# Patient Record
Sex: FEMALE | Race: AMERICAN INDIAN OR ALASKA NATIVE
[De-identification: names, ages, dates, MRNs, and addresses within clinical notes are randomized per-mention and may not be internally consistent; named-entity substitution may affect disease eponyms.]

---

## 2017-05-08 ENCOUNTER — HOSPITAL ENCOUNTER (EMERGENCY)
Dept: HOSPITAL 5 - ED | Age: 57
Discharge: LEFT BEFORE DISCHARGE | End: 2017-05-08
Payer: SELF-PAY

## 2017-05-08 VITALS — DIASTOLIC BLOOD PRESSURE: 88 MMHG | SYSTOLIC BLOOD PRESSURE: 144 MMHG

## 2017-05-08 DIAGNOSIS — M25.511: ICD-10-CM

## 2017-05-08 DIAGNOSIS — Z53.21: ICD-10-CM

## 2017-05-08 DIAGNOSIS — R07.9: Primary | ICD-10-CM

## 2017-05-08 DIAGNOSIS — M25.512: ICD-10-CM

## 2017-05-08 LAB
ALBUMIN SERPL-MCNC: 4.2 G/DL (ref 3.9–5)
ALBUMIN/GLOB SERPL: 1.4 %
ALP SERPL-CCNC: 59 UNITS/L (ref 35–129)
ALT SERPL-CCNC: 24 UNITS/L (ref 7–56)
ANION GAP SERPL CALC-SCNC: 18 MMOL/L
BACTERIA #/AREA URNS HPF: (no result) /HPF
BASOPHILS NFR BLD AUTO: 0.4 % (ref 0–1.8)
BILIRUB SERPL-MCNC: 0.6 MG/DL (ref 0.1–1.2)
BILIRUB UR QL STRIP: (no result)
BLOOD UR QL VISUAL: (no result)
BUN SERPL-MCNC: 15 MG/DL (ref 7–17)
BUN/CREAT SERPL: 18.75 %
CALCIUM SERPL-MCNC: 9.3 MG/DL (ref 8.4–10.2)
CHLORIDE SERPL-SCNC: 100 MMOL/L (ref 98–107)
CK SERPL-CCNC: 137 UNITS/L (ref 30–135)
CO2 SERPL-SCNC: 25 MMOL/L (ref 22–30)
EOSINOPHIL NFR BLD AUTO: 1.6 % (ref 0–4.3)
GLUCOSE SERPL-MCNC: 104 MG/DL (ref 65–100)
HCT VFR BLD CALC: 37.4 % (ref 30.3–42.9)
HGB BLD-MCNC: 12.1 GM/DL (ref 10.1–14.3)
KETONES UR STRIP-MCNC: (no result) MG/DL
LEUKOCYTE ESTERASE UR QL STRIP: (no result)
MCH RBC QN AUTO: 30 PG (ref 28–32)
MCHC RBC AUTO-ENTMCNC: 33 % (ref 30–34)
MCV RBC AUTO: 91 FL (ref 79–97)
MUCOUS THREADS #/AREA URNS HPF: (no result) /HPF
NITRITE UR QL STRIP: (no result)
PH UR STRIP: 5 [PH] (ref 5–7)
PLATELET # BLD: 195 K/MM3 (ref 140–440)
POTASSIUM SERPL-SCNC: 4 MMOL/L (ref 3.6–5)
PROT SERPL-MCNC: 7.1 G/DL (ref 6.3–8.2)
PROT UR STRIP-MCNC: (no result) MG/DL
RBC # BLD AUTO: 4.12 M/MM3 (ref 3.65–5.03)
RBC #/AREA URNS HPF: 9 /HPF (ref 0–6)
SODIUM SERPL-SCNC: 139 MMOL/L (ref 137–145)
UROBILINOGEN UR-MCNC: < 2 MG/DL (ref ?–2)
WBC # BLD AUTO: 7 K/MM3 (ref 4.5–11)
WBC #/AREA URNS HPF: 2 /HPF (ref 0–6)

## 2017-05-08 PROCEDURE — 82553 CREATINE MB FRACTION: CPT

## 2017-05-08 PROCEDURE — 84484 ASSAY OF TROPONIN QUANT: CPT

## 2017-05-08 PROCEDURE — 85025 COMPLETE CBC W/AUTO DIFF WBC: CPT

## 2017-05-08 PROCEDURE — 93010 ELECTROCARDIOGRAM REPORT: CPT

## 2017-05-08 PROCEDURE — 71020: CPT

## 2017-05-08 PROCEDURE — 80053 COMPREHEN METABOLIC PANEL: CPT

## 2017-05-08 PROCEDURE — 83880 ASSAY OF NATRIURETIC PEPTIDE: CPT

## 2017-05-08 PROCEDURE — 82550 ASSAY OF CK (CPK): CPT

## 2017-05-08 PROCEDURE — 93005 ELECTROCARDIOGRAM TRACING: CPT

## 2017-05-08 PROCEDURE — 36415 COLL VENOUS BLD VENIPUNCTURE: CPT

## 2017-05-08 PROCEDURE — 81001 URINALYSIS AUTO W/SCOPE: CPT

## 2017-05-08 NOTE — EMERGENCY DEPARTMENT REPORT
Entered by BETTIE JEAN BAPTISTE, acting as scribe for CARLOS MCCLOUD NP.


Chief Complaint: Chest Pain


Stated Complaint: CHEST PAIN, HEART DX


Time Seen by Provider: 05/08/17 14:34





- HPI


History of Present Illness: 


57 y/o non-toxic, non ill-appearing female in no acute distress presents to ED c

/o chest pain beginning 2 days ago, described as a heavy and aching with 

persistent soreness, radiating to shoulders bilaterally and noting her pain has 

moved from left chest to right chest. Exacerbated by deep breaths. Denies 

radiation to jaw or extremity swelling. Reports Hx cardiac stents, heart 

disease. Endorses mild SOB.





- ROS


Review of Systems: 


+ chest pain, aching, radiating 


+ sob


- abdominal pain, nausea, vomiting, extremity swelling





- Exam


Vital Signs: 


 Vital Signs











  05/08/17





  14:29


 


Temperature 98.0 F


 


Pulse Rate 74


 


Respiratory 18





Rate 


 


Blood Pressure 144/88


 


O2 Sat by Pulse 99





Oximetry 











Physical Exam: 


Cardio: Normal S1 S2 heart sounds, no murmurs


Resp: Clear to auscultation bilaterally


Pt is non-toxic, non ill appearing, no acute distress


Nonreproducible Chest pain


MSE screening note: 


Focused history and physical exam performed.


Due to findings the following was ordered:





CBC, troponin, cardiac CK, CMP, BNP, UA, CXR





ED Disposition for MSE


Condition: Stable





This documentation as recorded by the scribe,BETTIE JEAN BAPTISTE,accurately reflects the 

service I personally performed and the decisions made by ROGERS santiago MARTIN, AC.

## 2017-05-11 NOTE — ED ELOPEMENT REVIEW
ED Pt Elopement review





- Results review


Lab results: 





 Laboratory Tests











  05/08/17 05/08/17 05/08/17





  14:42 14:42 19:01


 


WBC  7.0  


 


RBC  4.12  


 


Hgb  12.1  


 


Hct  37.4  


 


MCV  91  


 


MCH  30  


 


MCHC  33  


 


RDW  12.9 L  


 


Plt Count  195  


 


Lymph % (Auto)  32.0  


 


Mono % (Auto)  7.5 H  


 


Eos % (Auto)  1.6  


 


Baso % (Auto)  0.4  


 


Lymph #  2.2  


 


Mono #  0.5  


 


Eos #  0.1  


 


Baso #  0.0  


 


Seg Neutrophils %  58.5  


 


Seg Neutrophils #  4.1  


 


Sodium   139 


 


Potassium   4.0 


 


Chloride   100.0 


 


Carbon Dioxide   25 


 


Anion Gap   18 


 


BUN   15 


 


Creatinine   0.8 


 


Estimated GFR   > 60 


 


BUN/Creatinine Ratio   18.75 


 


Glucose   104 H 


 


Calcium   9.3 


 


Total Bilirubin   0.60 


 


AST   22 


 


ALT   24 


 


Alkaline Phosphatase   59 


 


Total Creatine Kinase   137 H 


 


CK-MB (CK-2)   1.6 


 


CK-MB (CK-2) Rel Index   1.1 


 


Troponin T   < 0.010 


 


NT-Pro-B Natriuret Pep   76.06 


 


Total Protein   7.1 


 


Albumin   4.2 


 


Albumin/Globulin Ratio   1.4 


 


Urine Color    Yellow


 


Urine Turbidity    Slightly-cloudy


 


Urine pH    5.0


 


Ur Specific Gravity    1.018


 


Urine Protein    <15 mg/dl


 


Urine Glucose (UA)    Neg


 


Urine Ketones    Neg


 


Urine Blood    Mod


 


Urine Nitrite    Neg


 


Urine Bilirubin    Neg


 


Urine Urobilinogen    < 2.0


 


Ur Leukocyte Esterase    Lg


 


Urine WBC (Auto)    2.0


 


Urine RBC (Auto)    9.0


 


U Epithel Cells (Auto)    11.0


 


Urine Bacteria (Auto)    1+


 


Urine Mucus    Few














- Call Back decision


Pt Call Back Decision: No action required

## 2017-09-19 ENCOUNTER — HOSPITAL ENCOUNTER (INPATIENT)
Dept: HOSPITAL 5 - ED | Age: 57
LOS: 2 days | Discharge: HOME | DRG: 313 | End: 2017-09-21
Attending: INTERNAL MEDICINE | Admitting: INTERNAL MEDICINE
Payer: COMMERCIAL

## 2017-09-19 DIAGNOSIS — I25.10: ICD-10-CM

## 2017-09-19 DIAGNOSIS — Z90.710: ICD-10-CM

## 2017-09-19 DIAGNOSIS — I10: ICD-10-CM

## 2017-09-19 DIAGNOSIS — R07.89: Primary | ICD-10-CM

## 2017-09-19 DIAGNOSIS — F17.210: ICD-10-CM

## 2017-09-19 DIAGNOSIS — D64.9: ICD-10-CM

## 2017-09-19 DIAGNOSIS — Z83.3: ICD-10-CM

## 2017-09-19 DIAGNOSIS — I25.2: ICD-10-CM

## 2017-09-19 DIAGNOSIS — Z79.899: ICD-10-CM

## 2017-09-19 DIAGNOSIS — K21.9: ICD-10-CM

## 2017-09-19 DIAGNOSIS — E78.00: ICD-10-CM

## 2017-09-19 DIAGNOSIS — Z95.5: ICD-10-CM

## 2017-09-19 DIAGNOSIS — Z71.6: ICD-10-CM

## 2017-09-19 DIAGNOSIS — E87.6: ICD-10-CM

## 2017-09-19 DIAGNOSIS — Z72.89: ICD-10-CM

## 2017-09-19 DIAGNOSIS — F41.9: ICD-10-CM

## 2017-09-19 DIAGNOSIS — E78.5: ICD-10-CM

## 2017-09-19 LAB
ANION GAP SERPL CALC-SCNC: 19 MMOL/L
BASOPHILS NFR BLD AUTO: 0.5 % (ref 0–1.8)
BUN SERPL-MCNC: 21 MG/DL (ref 7–17)
BUN/CREAT SERPL: 35 %
CALCIUM SERPL-MCNC: 9.6 MG/DL (ref 8.4–10.2)
CHLORIDE SERPL-SCNC: 98.7 MMOL/L (ref 98–107)
CO2 SERPL-SCNC: 24 MMOL/L (ref 22–30)
EOSINOPHIL NFR BLD AUTO: 3.1 % (ref 0–4.3)
GLUCOSE SERPL-MCNC: 148 MG/DL (ref 65–100)
HCT VFR BLD CALC: 39.3 % (ref 30.3–42.9)
HGB BLD-MCNC: 13.2 GM/DL (ref 10.1–14.3)
MCH RBC QN AUTO: 29 PG (ref 28–32)
MCHC RBC AUTO-ENTMCNC: 34 % (ref 30–34)
MCV RBC AUTO: 87 FL (ref 79–97)
PLATELET # BLD: 186 K/MM3 (ref 140–440)
POTASSIUM SERPL-SCNC: 3.2 MMOL/L (ref 3.6–5)
RBC # BLD AUTO: 4.51 M/MM3 (ref 3.65–5.03)
SODIUM SERPL-SCNC: 138 MMOL/L (ref 137–145)
WBC # BLD AUTO: 4.8 K/MM3 (ref 4.5–11)

## 2017-09-19 PROCEDURE — 85025 COMPLETE CBC W/AUTO DIFF WBC: CPT

## 2017-09-19 PROCEDURE — 80061 LIPID PANEL: CPT

## 2017-09-19 PROCEDURE — 93017 CV STRESS TEST TRACING ONLY: CPT

## 2017-09-19 PROCEDURE — 36415 COLL VENOUS BLD VENIPUNCTURE: CPT

## 2017-09-19 PROCEDURE — 99406 BEHAV CHNG SMOKING 3-10 MIN: CPT

## 2017-09-19 PROCEDURE — 99285 EMERGENCY DEPT VISIT HI MDM: CPT

## 2017-09-19 PROCEDURE — 94760 N-INVAS EAR/PLS OXIMETRY 1: CPT

## 2017-09-19 PROCEDURE — 71020: CPT

## 2017-09-19 PROCEDURE — 93010 ELECTROCARDIOGRAM REPORT: CPT

## 2017-09-19 PROCEDURE — 84484 ASSAY OF TROPONIN QUANT: CPT

## 2017-09-19 PROCEDURE — A9502 TC99M TETROFOSMIN: HCPCS

## 2017-09-19 PROCEDURE — 78452 HT MUSCLE IMAGE SPECT MULT: CPT

## 2017-09-19 PROCEDURE — 93005 ELECTROCARDIOGRAM TRACING: CPT

## 2017-09-19 PROCEDURE — 85379 FIBRIN DEGRADATION QUANT: CPT

## 2017-09-19 PROCEDURE — 93306 TTE W/DOPPLER COMPLETE: CPT

## 2017-09-19 PROCEDURE — 80048 BASIC METABOLIC PNL TOTAL CA: CPT

## 2017-09-19 PROCEDURE — 83735 ASSAY OF MAGNESIUM: CPT

## 2017-09-20 LAB
ANION GAP SERPL CALC-SCNC: 18 MMOL/L
BUN SERPL-MCNC: 20 MG/DL (ref 7–17)
BUN/CREAT SERPL: 33.33 %
CALCIUM SERPL-MCNC: 9 MG/DL (ref 8.4–10.2)
CHLORIDE SERPL-SCNC: 103.2 MMOL/L (ref 98–107)
CO2 SERPL-SCNC: 24 MMOL/L (ref 22–30)
GLUCOSE SERPL-MCNC: 105 MG/DL (ref 65–100)
POTASSIUM SERPL-SCNC: 4 MMOL/L (ref 3.6–5)
SODIUM SERPL-SCNC: 141 MMOL/L (ref 137–145)

## 2017-09-20 RX ADMIN — ASPIRIN SCH MG: 325 TABLET ORAL at 11:38

## 2017-09-20 RX ADMIN — METOPROLOL TARTRATE SCH MG: 50 TABLET, FILM COATED ORAL at 21:30

## 2017-09-20 RX ADMIN — NITROGLYCERIN SCH: 20 OINTMENT TOPICAL at 17:55

## 2017-09-20 RX ADMIN — HEPARIN SODIUM SCH UNIT: 5000 INJECTION, SOLUTION INTRAVENOUS; SUBCUTANEOUS at 21:29

## 2017-09-20 RX ADMIN — NITROGLYCERIN SCH: 20 OINTMENT TOPICAL at 15:02

## 2017-09-20 RX ADMIN — NITROGLYCERIN SCH INCH: 20 OINTMENT TOPICAL at 11:42

## 2017-09-20 RX ADMIN — HEPARIN SODIUM SCH UNIT: 5000 INJECTION, SOLUTION INTRAVENOUS; SUBCUTANEOUS at 11:43

## 2017-09-20 RX ADMIN — HYDROCHLOROTHIAZIDE SCH MG: 12.5 CAPSULE ORAL at 11:38

## 2017-09-20 RX ADMIN — NITROGLYCERIN SCH INCH: 20 OINTMENT TOPICAL at 04:22

## 2017-09-20 RX ADMIN — LISINOPRIL SCH MG: 20 TABLET ORAL at 11:40

## 2017-09-20 RX ADMIN — METOPROLOL TARTRATE SCH MG: 50 TABLET, FILM COATED ORAL at 11:42

## 2017-09-20 NOTE — CONSULTATION
History of Present Illness


Consult date: 09/20/17


Requesting physician: DUNIA WARD


Consult reason: chest pain


History of present illness: 


The pt is a 55 YO female with a past medical history significant for HTN, HLP, 

CAD s/p MI with PCI of proximal left circ in 2006 at [place], anemia requiring 

multiple blood transfusions, anxiety, tobacco use (smokes 3-4 cigarettes daily) 

and ETOH use (drinks 3 glasses of wine daily). She is previously unknown to our 

practice. She states the last cardiologist she saw was at [facility] in January, 2017. She presented with c/o chest pain for the past 3-4 days prior to arrival. 

She describes the pain as a nonexertional, nonradiating, intermittent left 

sided aching pain which was associated with SOB, diaphoresis, palpitations and 

lightheadedness. The pain is aggravated by lying flat and alleviated by sitting 

upright. She also reports some right sided chest soreness for 1-2 days PTA. She 

denies any recent fever, chills, cough, n/v or syncope. On evaluation, she 

states that the chest pain has resolved since nitro patch was placed. The pain 

is not reproducible with palpation. She underwent stress test this AM and 

results are pending. 








Past History


Past Medical History: CAD, hypertension, hyperlipidemia, other (PCI in 2006)


Past Surgical History: hysterectomy


Social history: smoking, alcohol abuse.  denies: prescription drug abuse


Family history: diabetes





Medications and Allergies


 Allergies











Allergy/AdvReac Type Severity Reaction Status Date / Time


 


No Known Allergies Allergy   Unverified 02/23/14 12:06











 Home Medications











 Medication  Instructions  Recorded  Confirmed  Last Taken  Type


 


Ibuprofen [Motrin] 800 mg PO TID PRN #20 tablet 02/23/14 09/20/17 07/22/17 Rx


 


Lisinopril/Hydrochlorothiazide 1 tab PO QDAY 02/23/14 09/20/17 08/21/17 History





[Zestoretic 20-12.5 mg]     


 


Metoprolol [Lopressor TAB] 50 mg PO BID 02/23/14 09/20/17 09/20/17 History











Active Meds: 


Active Medications





Aspirin (Aspirin)  325 mg PO DAILY [facility]


Heparin Sodium (Porcine) (Heparin)  5,000 unit SUB-Q Q12HR [facility]


Hydrochlorothiazide (Hctz)  12.5 mg PO QDAY [facility]


Lisinopril (Zestril)  20 mg PO QDAY [facility]


Metoprolol Tartrate (Lopressor)  50 mg PO BID [facility]


Morphine Sulfate (Morphine)  2 mg IV Q2H PRN


   PRN Reason: Pain, Moderate (4-6)


Nitroglycerin (Nitrostat)  0.4 mg SL Q5M PRN


   PRN Reason: Chest Pain


Nitroglycerin (Nitro-Bid 2%)  1 inch TP QIDNTG [facility]


   PRN Reason: Protocol


   Last Admin: 09/20/17 04:22 Dose:  1 inch


Ondansetron HCl (Zofran)  4 mg IV Q6H PRN


   PRN Reason: Nausea And Vomiting











Review of Systems


Constitutional: sweats, no weight loss, no weight gain, no fever, no chills


Ears, nose, mouth and throat: no ear pain, no nose pain, no sinus pressure, no 

sinus pain


Cardiovascular: chest pain, palpitations, lightheadedness, shortness of breath, 

high blood pressure, no orthopnea, no rapid/irregular heart beat, no edema, no 

syncope, no dyspnea on exertion, no paroxysmal nocturnal dyspnea, no leg edema, 

no decreased exercise tolerance


Respiratory: shortness of breath, no cough, no dyspnea on exertion, no 

congestion, no wheezing, no pain on inspiration


Gastrointestinal: no abdominal pain, no nausea, no vomiting, no diarrhea, no 

constipation, no change in bowel habits


Genitourinary Female: no pelvic pain, no flank pain, no dysuria, no urinary 

frequency, no urgency


Musculoskeletal: no neck stiffness, no neck pain, no shooting arm pain, no arm 

numbness/tingling, no low back pain, no shooting leg pain, no leg numbness/

tingling, no redness of joints


Integumentary: no rash, no pruritis, no redness, no sores, no wounds


Neurological: no head injury, no paralysis, no weakness, no parathesias, no 

numbness, no tingling, no seizures


Psychiatric: anxiety


Endocrine: no cold intolerance, no heat intolerance


Hematologic/Lymphatic: no easy bruising, no easy bleeding, no lymphadenopathy


Allergic/Immunologic: no urticaria, no wheezing, no persistent infections





Physical Examination


 Vital Signs











Temp Pulse Resp BP Pulse Ox


 


 98.6 F   90   16   163/93   97 


 


 09/19/17 18:24  09/19/17 18:24  09/19/17 18:24  09/19/17 18:24  09/19/17 18:24











General appearance: no acute distress


HEENT: Positive: PERRL, Normocephaly, Mucus Membranes Moist


Neck: Positive: neck supple, trachea midline


Cardiac: Positive: Reg Rate and Rhythm, S1/S2, Systolic Murmur


Lungs: Positive: clear to auscultation


Neuro: Positive: Grossly Intact, Cranial Nerve 2-12 Intact


Abdomen: Positive: Unremarkable, Soft, Active Bowel Sounds.  Negative: Tender


Skin: Positive: Clear.  Negative: Rash


Musculoskeletal: No Fluid Collection, No Pain, Normal Range of Motion


Extremities: Absent: edema





Results





 09/19/17 18:38





 09/20/17 06:40


 Comprehensive Metabolic Panel











  09/20/17 Range/Units





  06:40 


 


Sodium  141  (137-145)  mmol/L


 


Potassium  4.0  D  (3.6-5.0)  mmol/L


 


Chloride  103.2  ()  mmol/L


 


Carbon Dioxide  24  (22-30)  mmol/L


 


BUN  20 H  (7-17)  mg/dL


 


Creatinine  0.6 L  (0.7-1.2)  mg/dL


 


Glucose  105 H  ()  mg/dL


 


Calcium  9.0  (8.4-10.2)  mg/dL














- Imaging and Cardiology


Echo: pending


EKG: image reviewed





EKG interpretations





- Telemetry


EKG Rhythm: Sinus Rhythm





- EKG


Sinus rhythms and dysrhythmias: sinus rhythm


Repolarization changes or abnormalities: nonspecific abnormality, ST segment, 

and/or T wave





Assessment and Plan


Assessment:


Chest pain, atypical - ECG with NAF; troponin negative for AMI x 3 sets


CAD s/p MI with PCI of proximal left circ in 2006 at [place]


HTN


HLP


Hypokalemia - repleted


H/o anemia requiring multiple blood transfusions - H/H currently WNL


Tobacco use / ETOH use - cessation encouraged





Plan:


Lexiscan MPI stress test this AM was negative for ischemia, EF 63%. 


Obtain echo. 


Cont , lopressor and lisinopril/HCTZ. Titrate as necessary for BP 

optimization. 


Obtain lipid panel. Plan to initiate lovastatin as OP d/t pt's current lack of 

insurance and financial difficulties. 


Assessment and plan reviewed with pt at bedside. 





The patient has been seen in conjunction with Dr. Bojorquez who agrees with the 

assessment and plan of care.

## 2017-09-20 NOTE — EMERGENCY DEPARTMENT REPORT
ED General Adult HPI





- General


Chief complaint: Chest Pain


Stated complaint: CHEST PAIN


Time Seen by Provider: 09/20/17 02:07


Source: patient, RN notes reviewed


Mode of arrival: Wheelchair


Limitations: No Limitations





- History of Present Illness


Initial comments: 





This is a 56-year-old female, the patient is previously unknown to me.





Secondary to insurance issues, she does not have a local primary care doctor 

cardiologist, she reports past medical history of heart disease with stent, 

hypertension and anemia, high cholesterol





Patient presents to the ER with central chest pressure which radiates to the 

right upper extremity.  She endorses diaphoresis, shortness of breath, 

palpitations and malaise.  The symptoms are intermittent, and do not have 

exacerbating or relieving factors.  There is no leg pain, there is no leg 

swelling, no recent trips greater than 4 hours, no recent hospital admissions, 

patient endorses no recent cardiac risk stratification or stress test.


-: Gradual


Location: chest


Severity scale (0 -10): 5


Quality: aching


Consistency: intermittent


Improves with: none


Worsens with: none


Associated Symptoms: chest pain





- Related Data


 Home Medications











 Medication  Instructions  Recorded  Confirmed  Last Taken


 


Lisinopril/Hydrochlorothiazide 1 tab PO QDAY 02/23/14 09/20/17 08/21/17





[Zestoretic 20-12.5 mg]    


 


Metoprolol [Lopressor TAB] 50 mg PO BID 02/23/14 09/20/17 09/20/17








 Previous Rx's











 Medication  Instructions  Recorded  Last Taken  Type


 


Ibuprofen [Motrin] 800 mg PO TID PRN #20 tablet 02/23/14 07/22/17 Rx











 Allergies











Allergy/AdvReac Type Severity Reaction Status Date / Time


 


No Known Allergies Allergy   Unverified 02/23/14 12:06














ED Review of Systems


ROS: 


Stated complaint: CHEST PAIN


Other details as noted in HPI





Constitutional: malaise.  denies: fever


Eyes: denies: eye discharge


ENT: denies: epistaxis


Respiratory: denies: cough


Cardiovascular: chest pain, palpitations


Gastrointestinal: denies: vomiting


Genitourinary: denies: dysuria


Musculoskeletal: denies: back pain


Skin: denies: lesions


Neurological: weakness


Psychiatric: anxiety





ED Past Medical Hx





- Past Medical History


Previous Medical History?: Yes


Hx Hypertension: Yes


Hx Heart Attack/AMI: Yes (2006)


Hx GERD: Yes


Additional medical history: CAD





- Surgical History


Past Surgical History?: Yes


Hx Coronary Stent: Yes


Additional Surgical History: high cholesterol





- Social History


Smoking Status: Current Some Day Smoker


Substance Use Type: Alcohol





- Medications


Home Medications: 


 Home Medications











 Medication  Instructions  Recorded  Confirmed  Last Taken  Type


 


Ibuprofen [Motrin] 800 mg PO TID PRN #20 tablet 02/23/14 09/20/17 07/22/17 Rx


 


Lisinopril/Hydrochlorothiazide 1 tab PO QDAY 02/23/14 09/20/17 08/21/17 History





[Zestoretic 20-12.5 mg]     


 


Metoprolol [Lopressor TAB] 50 mg PO BID 02/23/14 09/20/17 09/20/17 History














ED Physical Exam





- General


Limitations: No Limitations


General appearance: alert, in no apparent distress





- Head


Head exam: Present: atraumatic, normocephalic





- Eye


Eye exam: Present: normal appearance, EOMI.  Absent: nystagmus





- ENT


ENT exam: Present: normal exam, normal orophraynx, mucous membranes moist, 

normal external ear exam





- Neck


Neck exam: Present: normal inspection, full ROM.  Absent: tenderness, 

meningismus





- Respiratory


Respiratory exam: Present: normal lung sounds bilaterally.  Absent: respiratory 

distress, wheezes, rales, rhonchi, stridor, chest wall tenderness





- Cardiovascular


Cardiovascular Exam: Present: regular rate, normal rhythm, normal heart sounds.

  Absent: bradycardia, tachycardia, irregular rhythm, systolic murmur, 

diastolic murmur, rubs, gallop





- GI/Abdominal


GI/Abdominal exam: Present: soft, normal bowel sounds.  Absent: distended, 

tenderness, guarding, rebound, rigid, pulsatile mass





- Extremities Exam


Extremities exam: Present: normal inspection, full ROM, normal capillary 

refill.  Absent: tenderness, pedal edema, joint swelling, calf tenderness





- Back Exam


Back exam: Present: normal inspection, full ROM.  Absent: tenderness, CVA 

tenderness (R), CVA tenderness (L), muscle spasm, paraspinal tenderness, 

vertebral tenderness





- Neurological Exam


Neurological exam: Present: alert, oriented X3, normal gait, other (Extraocular 

movements intact.  Tongue midline.  No facial droop.  Facial sensation intact 

to light touch in the V1, V2, V3 distribution bilaterally.  5 and 5 strength in 

4 extremities..  Sensation is intact to light touch in 4 extremities.).  Absent

: motor sensory deficit





- Psychiatric


Psychiatric exam: Present: normal affect, normal mood





- Skin


Skin exam: Present: warm, dry, intact, normal color.  Absent: rash





ED Course


 Vital Signs











  09/19/17 09/20/17 09/20/17





  18:24 01:10 01:16


 


Temperature 98.6 F  


 


Pulse Rate 90 72 74


 


Respiratory 16 16 13





Rate   


 


Blood Pressure 163/93  153/83


 


O2 Sat by Pulse 97  99





Oximetry   














  09/20/17





  01:19


 


Temperature 96.8 F L


 


Pulse Rate 


 


Respiratory 17





Rate 


 


Blood Pressure 


 


O2 Sat by Pulse 100





Oximetry 














ED Medical Decision Making





- Lab Data


Result diagrams: 


 09/19/17 18:38





 09/19/17 18:38








 Vital Signs











  09/19/17 09/20/17 09/20/17





  18:24 01:10 01:16


 


Temperature 98.6 F  


 


Pulse Rate 90 72 74


 


Respiratory 16 16 13





Rate   


 


Blood Pressure 163/93  153/83


 


O2 Sat by Pulse 97  99





Oximetry   














  09/20/17 09/20/17





  01:19 04:22


 


Temperature 96.8 F L 


 


Pulse Rate  


 


Respiratory 17 





Rate  


 


Blood Pressure  148/78


 


O2 Sat by Pulse 100 





Oximetry  














 Lab Results











  09/19/17 09/19/17 09/19/17 Range/Units





  18:38 18:38 20:45 


 


WBC  4.8    (4.5-11.0)  K/mm3


 


RBC  4.51    (3.65-5.03)  M/mm3


 


Hgb  13.2    (10.1-14.3)  gm/dl


 


Hct  39.3    (30.3-42.9)  %


 


MCV  87    (79-97)  fl


 


MCH  29    (28-32)  pg


 


MCHC  34    (30-34)  %


 


RDW  13.0 L    (13.2-15.2)  %


 


Plt Count  186    (140-440)  K/mm3


 


Lymph % (Auto)  33.7    (13.4-35.0)  %


 


Mono % (Auto)  6.6    (0.0-7.3)  %


 


Eos % (Auto)  3.1    (0.0-4.3)  %


 


Baso % (Auto)  0.5    (0.0-1.8)  %


 


Lymph #  1.6    (1.2-5.4)  K/mm3


 


Mono #  0.3    (0.0-0.8)  K/mm3


 


Eos #  0.1    (0.0-0.4)  K/mm3


 


Baso #  0.0    (0.0-0.1)  K/mm3


 


Seg Neutrophils %  56.1    (40.0-70.0)  %


 


Seg Neutrophils #  2.7    (1.8-7.7)  K/mm3


 


Sodium   138   (137-145)  mmol/L


 


Potassium   3.2 L   (3.6-5.0)  mmol/L


 


Chloride   98.7   ()  mmol/L


 


Carbon Dioxide   24   (22-30)  mmol/L


 


Anion Gap   19   mmol/L


 


BUN   21 H   (7-17)  mg/dL


 


Creatinine   0.6 L   (0.7-1.2)  mg/dL


 


Estimated GFR   > 60   ml/min


 


BUN/Creatinine Ratio   35.00   %


 


Glucose   148 H   ()  mg/dL


 


Calcium   9.6   (8.4-10.2)  mg/dL


 


Magnesium     (1.7-2.3)  mg/dL


 


Troponin T   < 0.010  < 0.010  (0.00-0.029)  ng/mL














  09/20/17 09/20/17 Range/Units





  00:19 02:30 


 


WBC    (4.5-11.0)  K/mm3


 


RBC    (3.65-5.03)  M/mm3


 


Hgb    (10.1-14.3)  gm/dl


 


Hct    (30.3-42.9)  %


 


MCV    (79-97)  fl


 


MCH    (28-32)  pg


 


MCHC    (30-34)  %


 


RDW    (13.2-15.2)  %


 


Plt Count    (140-440)  K/mm3


 


Lymph % (Auto)    (13.4-35.0)  %


 


Mono % (Auto)    (0.0-7.3)  %


 


Eos % (Auto)    (0.0-4.3)  %


 


Baso % (Auto)    (0.0-1.8)  %


 


Lymph #    (1.2-5.4)  K/mm3


 


Mono #    (0.0-0.8)  K/mm3


 


Eos #    (0.0-0.4)  K/mm3


 


Baso #    (0.0-0.1)  K/mm3


 


Seg Neutrophils %    (40.0-70.0)  %


 


Seg Neutrophils #    (1.8-7.7)  K/mm3


 


Sodium    (137-145)  mmol/L


 


Potassium    (3.6-5.0)  mmol/L


 


Chloride    ()  mmol/L


 


Carbon Dioxide    (22-30)  mmol/L


 


Anion Gap    mmol/L


 


BUN    (7-17)  mg/dL


 


Creatinine    (0.7-1.2)  mg/dL


 


Estimated GFR    ml/min


 


BUN/Creatinine Ratio    %


 


Glucose    ()  mg/dL


 


Calcium    (8.4-10.2)  mg/dL


 


Magnesium   2.20  (1.7-2.3)  mg/dL


 


Troponin T  < 0.010   (0.00-0.029)  ng/mL














- EKG Data





09/20/17 04:23


EKG #1 demonstrates normal sinus, 82 bpm, high left ventricular voltage, normal 

axis, QTC prolonged, not morphologically consistent with STEMI.





EKG #2 demonstrates normal sinus, 71 bpm, normal axis, not morphologically 

consistent with STEMI





- Radiology Data


Radiology results: image reviewed


interpreted by me: 





X-ray of the chest is negative for acute disease





- Medical Decision Making





Differential diagnosis: Acute coronary syndrome, unstable angina, pneumonia, 

anxiety, conversion disorder, electrolyte derangement














Assessment and plan: 56-year-old female with stent, poor compliance with aspirin

, poor compliance with outpatient follow-up, some concerning historical factors

, moderate risk by heart score, no pulmonary embolus or DVT risk factors, low 

risk by well's criteria, to be admitted for acute coronary syndrome risk 

stratification.  Incidentally found to be hypokalemic, this is corrected.  Case 

is presented to the Hospital physician, Dr. Stallworth, who accepts the patient to 

his service.


Critical care attestation.: 


If time is entered above; I have spent that time in minutes in the direct care 

of this critically ill patient, excluding procedure time.








ED Disposition


Clinical Impression: 


 Chest pain, Hypokalemia





Disposition: DC-09 OP ADMIT IP TO THIS HOSP


Is pt being admited?: Yes


Condition: Good

## 2017-09-20 NOTE — HISTORY AND PHYSICAL REPORT
CHIEF COMPLAINT:  Chest pain.



HISTORY OF PRESENT ILLNESS:  The patient is a 56-year-old female presenting with

chest pain going on and off for about 4 days.  Pain is in the precordial

retrosternal area, radiates to the shoulder area and upper extremity and was

associated with shortness of breath, diaphoresis, and palpitation.  There was no

history of nausea or vomiting.  There was also no history of dizziness.  The

patient presented to the Emergency Room.  The pain was relieved with pain

medication.



PAST MEDICAL HISTORY:  Pertinent for hypertension, coronary artery disease

status post myocardial infarction in 2006, also the patient has past history of

gastroesophageal reflux disease.



PAST SURGICAL HISTORY:  Pertinent for stent placement.



FAMILY HISTORY:  Noncontributory.



SOCIAL HISTORY:  The patient smokes cigarettes, drinks alcohol occasionally, and

does not use illicit drugs.



MEDICATIONS:  The patient is on ibuprofen 800 mg 3 times daily as needed for

pain, lisinopril/HCTZ 20/12.5 one p.o. daily.  The patient is also on metoprolol

50 mg by mouth twice daily.



ALLERGIES:  There are no known drug allergies.



REVIEW OF SYSTEMS:

CONSTITUTIONAL:  There is no fever, no chills.  Diaphoresis present.

HEENT:  There is no headache or sore throat.

CARDIOVASCULAR SYSTEM:  There is chest pain, but no orthopnea.

RESPIRATORY SYSTEM:  There is shortness of breath and no cough.

GASTROINTESTINAL SYSTEM:  There is no nausea, no vomiting, no abdominal pain,

diarrhea or constipation.

NEUROLOGICAL SYSTEM:  There is no numbness, no dizziness, no altered mental

status.

MUSCULOSKELETAL SYSTEM:  There is no joint pain or swelling.

DERMATOLOGICAL SYSTEM:  There is no skin rash or itching.

GENITOURINARY:  There is no dysuria, hematuria, or flank pain.  Rest of system

review is normal.



PHYSICAL EXAMINATION:

GENERAL:  At the time of exam, the patient was found to be alert, oriented x 3

and not in acute distress.

VITAL SIGNS:  Shows temperature of 96.8 degrees Fahrenheit, pulse of 74,

respiration of 13, blood pressure _____, O2 sat of 99% on room air.

HEENT:  Shows pupils appear to be equal, round, reactive to light and

accommodating.  Extraocular muscles are intact.

NECK:  Supple with no JVD or carotid bruit.

CARDIOVASCULAR SYSTEM:  Show first and second heart sounds with no gallops or

murmurs.

RESPIRATORY SYSTEM:  Showed good air entry on both sides of the lungs with no

abnormal breath sounds.

GASTROINTESTINAL SYSTEM:  Show abdomen to be full, soft, nontender with no

organomegaly or rigidity.

NEUROLOGICAL:  Shows no focal deficit.

MUSCULOSKELETAL SYSTEM:  Show no joint swelling or tenderness.

DERMATOLOGICAL SYSTEM:  Showing no skin rash.

GENITOURINARY SYSTEM:  Showed no costovertebral angle tenderness.



PERTINENT LABORATORY DATA AND IMAGING STUDIES:  The patient has CBC done, which

shows normal WBC, normal hemoglobin and normal hematocrit.  CBC differential was

unremarkable.  Chemistry showed low potassium level of 3.2 and a slightly

elevated BUN of 21 with an unremarkable creatinine and normal GFR of greater

than 60.  Cardiac enzyme:  The patient had 3 sets of troponin level done that

were unremarkable.  Magnesium level came back normal.



IMAGING STUDIES:  The patient had chest x-ray done that shows hyperinflated

lungs with no acute cardiopulmonary lesion.



DIAGNOSES:

1. Chest pain.

2. Hypokalemia.



PLAN:  The patient will be admitted to medical floor on telemetry and will be

n.p.o. for Lexiscan stress test in the morning.  The patient will be on nitro

paste 1 inch to anterior chest wall q. 6 hours and will be on aspirin 325 mg by

mouth daily.  The patient will be on heparin 5000 units subQ q. 12 hours for DVT

prophylaxis and will be on sublingual nitroglycerin 0.4 mg every 5 minutes as

needed for breakthrough chest pain.  The patient has already had one dose of

potassium chloride 40 mEq given in the Emergency Room and will have basic

metabolic panel checked this morning.  The patient will be on oxygen by nasal

cannula at 2 liters per minute and will be on morphine 2 mg IV q. 2 hours as

needed for pain.  The patient will also be on IV Zofran 4 mg every 6 hours for

nausea and vomiting.  Further management of the patient's condition will be

dependent on the stress test.





DD: 09/20/2017 04:43

DT: 09/20/2017 06:53

JOB# [id number]  [id number]

OCN/NTS

## 2017-09-20 NOTE — XRAY REPORT
ROUTINE CHEST, TWO VIEWS:



HISTORY:  chest pain.



The trachea, heart, mediastinal contour, lung fields and bony thorax 

are unremarkable.



IMPRESSION:

Unremarkable chest x-ray. No change since 5/8/17.

## 2017-09-21 VITALS — SYSTOLIC BLOOD PRESSURE: 141 MMHG | DIASTOLIC BLOOD PRESSURE: 78 MMHG

## 2017-09-21 LAB
CHOLEST SERPL-MCNC: 169 MG/DL (ref 50–199)
HDLC SERPL-MCNC: 55 MG/DL (ref 40–59)
TRIGL SERPL-MCNC: 82 MG/DL (ref 2–149)

## 2017-09-21 RX ADMIN — METOPROLOL TARTRATE SCH MG: 50 TABLET, FILM COATED ORAL at 09:29

## 2017-09-21 RX ADMIN — NITROGLYCERIN SCH: 20 OINTMENT TOPICAL at 09:30

## 2017-09-21 RX ADMIN — ASPIRIN SCH MG: 325 TABLET ORAL at 09:29

## 2017-09-21 RX ADMIN — HYDROCHLOROTHIAZIDE SCH MG: 12.5 CAPSULE ORAL at 09:29

## 2017-09-21 RX ADMIN — LISINOPRIL SCH MG: 20 TABLET ORAL at 09:29

## 2017-09-21 RX ADMIN — NITROGLYCERIN SCH: 20 OINTMENT TOPICAL at 06:43

## 2017-09-21 RX ADMIN — HEPARIN SODIUM SCH: 5000 INJECTION, SOLUTION INTRAVENOUS; SUBCUTANEOUS at 09:30

## 2017-09-21 NOTE — DISCHARGE SUMMARY
Providers





- Providers


Date of Admission: 


09/20/17 03:33





Date of discharge: 09/21/17


Attending physician: 


DUNIA WARD





 





09/20/17 07:57


Consult to Physician [CONS] Routine 


   Consulting Provider: FLIP PALACIOS


   Reason For Exam: cp


   Place consult to:: Dr. Palacios


   Notified:: Samy NG


   Was contact made?: Yes


   If yes, spoke with:: Dr. Bojorquez


   Time called:: 08:57











Primary care physician: 


PRIMARY CARE MD








Hospitalization


Reason for admission: cp


Condition: Good


Hospital course: 





The pt is a 55 YO female with a past medical history significant for HTN, HLP, 

CAD s/p MI with PCI of proximal left circ in 2006 at [place], anemia requiring 

multiple blood transfusions, anxiety, tobacco use (smokes 3-4 cigarettes daily) 

and ETOH use (drinks 3 glasses of wine daily) presented with c/o chest pain for 

the past 3-4 days prior to admission. She described the pain as a nonexertional

, nonradiating, intermittent left sided aching pain which was associated with 

SOB, diaphoresis, palpitations and lightheadedness. The pain was aggravated by 

lying flat and alleviated by sitting upright. She also reported some right 

sided chest soreness for 1-2 days PTA. She denied any recent fever, chills, 

cough, n/v or syncope.  The patient was seen by cardiology in consultation.  

The patient had EKG with no acute findings.  Troponin negative for a MI 3 

sets.  Lexiscan stress test was found to be negative.  Ejection fraction was 

noted to be 63%.  Echocardiogram was completed and will be followed up with 

cardiology as an outpatient.  D-dimer was found to be negative.  Dedicated 

discharge time 31 minutes.


Disposition: DC-01 TO HOME OR SELFCARE


Time spent for discharge: 31





Core Measure Documentation





- Palliative Care


Palliative Care/ Comfort Measures: Not Applicable





- Core Measures


Any of the following diagnoses?: none





Exam





- Constitutional


Vitals: 


 











Temp Pulse Resp BP Pulse Ox


 


 97.8 F   67   18   141/78   100 


 


 09/21/17 05:51  09/21/17 06:43  09/21/17 05:51  09/21/17 06:43  09/21/17 05:51











General appearance: Present: no acute distress, well-nourished





- EENT


Eyes: Present: PERRL


ENT: hearing intact, clear oral mucosa





- Neck


Neck: Present: supple, normal ROM





- Respiratory


Respiratory effort: normal


Respiratory: bilateral: CTA





- Cardiovascular


Heart Sounds: Present: S1 & S2.  Absent: rub, click





- Extremities


Extremities: pulses symmetrical, No edema


Peripheral Pulses: within normal limits





- Abdominal


General gastrointestinal: Present: soft, non-tender, non-distended, normal 

bowel sounds


Female genitourinary: Present: normal





- Integumentary


Integumentary: Present: clear, warm, dry





- Musculoskeletal


Musculoskeletal: gait normal, strength equal bilaterally





- Psychiatric


Psychiatric: appropriate mood/affect, intact judgment & insight





- Neurologic


Neurologic: CNII-XII intact, moves all extremities





Plan


Activity: no restrictions


Weight Bearing Status: Full Weight Bearing


Diet: regular


Follow up with: 


PRIMARY CARE,MD [Primary Care Provider] - 3-5 Days


Prescriptions: 


Aspirin [Aspirin TAB] 325 mg PO DAILY #30 tablet


Lisinopril/Hydrochlorothiazide [Zestoretic 20-12.5 mg] 1 tab PO QDAY #30 tab


Metoprolol [Lopressor TAB] 50 mg PO BID #60 tablet


Pantoprazole [Protonix TAB] 20 mg PO QDAY #30 tablet.

## 2018-08-06 ENCOUNTER — HOSPITAL ENCOUNTER (EMERGENCY)
Dept: HOSPITAL 5 - ED | Age: 58
Discharge: HOME | End: 2018-08-06
Payer: COMMERCIAL

## 2018-08-06 VITALS — DIASTOLIC BLOOD PRESSURE: 94 MMHG | SYSTOLIC BLOOD PRESSURE: 172 MMHG

## 2018-08-06 DIAGNOSIS — I10: ICD-10-CM

## 2018-08-06 DIAGNOSIS — S39.012A: Primary | ICD-10-CM

## 2018-08-06 DIAGNOSIS — S13.4XXA: ICD-10-CM

## 2018-08-06 DIAGNOSIS — E78.00: ICD-10-CM
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
## 2018-08-06 DIAGNOSIS — K21.9: ICD-10-CM
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
## 2018-08-06 DIAGNOSIS — Z95.1: ICD-10-CM

## 2018-08-06 DIAGNOSIS — I25.2: ICD-10-CM

## 2018-08-06 DIAGNOSIS — Z79.82: ICD-10-CM
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
## 2018-08-06 DIAGNOSIS — F17.200: ICD-10-CM

## 2018-08-06 PROCEDURE — 72040 X-RAY EXAM NECK SPINE 2-3 VW: CPT

## 2018-08-06 PROCEDURE — 72100 X-RAY EXAM L-S SPINE 2/3 VWS: CPT

## 2018-08-06 PROCEDURE — 99283 EMERGENCY DEPT VISIT LOW MDM: CPT
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
## 2020-01-29 ENCOUNTER — HOSPITAL ENCOUNTER (EMERGENCY)
Dept: HOSPITAL 5 - ED | Age: 60
Discharge: HOME | End: 2020-01-29
Payer: COMMERCIAL

## 2020-01-29 VITALS — SYSTOLIC BLOOD PRESSURE: 142 MMHG | DIASTOLIC BLOOD PRESSURE: 73 MMHG

## 2020-01-29 DIAGNOSIS — E78.00: ICD-10-CM

## 2020-01-29 DIAGNOSIS — K21.9: ICD-10-CM

## 2020-01-29 DIAGNOSIS — F17.200: ICD-10-CM

## 2020-01-29 DIAGNOSIS — Z95.5: ICD-10-CM

## 2020-01-29 DIAGNOSIS — K42.9: Primary | ICD-10-CM

## 2020-01-29 DIAGNOSIS — R14.0: ICD-10-CM

## 2020-01-29 DIAGNOSIS — Z90.710: ICD-10-CM

## 2020-01-29 DIAGNOSIS — F10.10: ICD-10-CM

## 2020-01-29 DIAGNOSIS — I25.10: ICD-10-CM

## 2020-01-29 DIAGNOSIS — R07.9: ICD-10-CM

## 2020-01-29 DIAGNOSIS — K52.9: ICD-10-CM

## 2020-01-29 DIAGNOSIS — I25.2: ICD-10-CM

## 2020-01-29 DIAGNOSIS — I10: ICD-10-CM

## 2020-01-29 DIAGNOSIS — Z86.2: ICD-10-CM

## 2020-01-29 DIAGNOSIS — R10.84: ICD-10-CM

## 2020-01-29 DIAGNOSIS — Z79.899: ICD-10-CM

## 2020-01-29 LAB
ALBUMIN SERPL-MCNC: 4.2 G/DL (ref 3.9–5)
ALT SERPL-CCNC: 76 UNITS/L (ref 7–56)
APTT BLD: 32.5 SEC. (ref 24.2–36.6)
BASOPHILS # (AUTO): 0.1 K/MM3 (ref 0–0.1)
BASOPHILS NFR BLD AUTO: 0.9 % (ref 0–1.8)
BILIRUB UR QL STRIP: (no result)
BLOOD UR QL VISUAL: (no result)
BUN SERPL-MCNC: 12 MG/DL (ref 7–17)
BUN/CREAT SERPL: 20 %
CALCIUM SERPL-MCNC: 9.3 MG/DL (ref 8.4–10.2)
EOSINOPHIL # BLD AUTO: 0.2 K/MM3 (ref 0–0.4)
EOSINOPHIL NFR BLD AUTO: 2.5 % (ref 0–4.3)
HCT VFR BLD CALC: 36.9 % (ref 30.3–42.9)
HEMOLYSIS INDEX: 33
HGB BLD-MCNC: 12.6 GM/DL (ref 10.1–14.3)
INR PPP: 1.03 (ref 0.87–1.13)
LYMPHOCYTES # BLD AUTO: 3.9 K/MM3 (ref 1.2–5.4)
LYMPHOCYTES NFR BLD AUTO: 54.7 % (ref 13.4–35)
MCHC RBC AUTO-ENTMCNC: 34 % (ref 30–34)
MCV RBC AUTO: 87 FL (ref 79–97)
MONOCYTES # (AUTO): 0.4 K/MM3 (ref 0–0.8)
MONOCYTES % (AUTO): 5.8 % (ref 0–7.3)
MUCOUS THREADS #/AREA URNS HPF: (no result) /HPF
PH UR STRIP: 7 [PH] (ref 5–7)
PLATELET # BLD: 236 K/MM3 (ref 140–440)
PROT UR STRIP-MCNC: (no result) MG/DL
RBC # BLD AUTO: 4.25 M/MM3 (ref 3.65–5.03)
RBC #/AREA URNS HPF: 3 /HPF (ref 0–6)
UROBILINOGEN UR-MCNC: 2 MG/DL (ref ?–2)
WBC #/AREA URNS HPF: 1 /HPF (ref 0–6)

## 2020-01-29 PROCEDURE — 93005 ELECTROCARDIOGRAM TRACING: CPT

## 2020-01-29 PROCEDURE — 71046 X-RAY EXAM CHEST 2 VIEWS: CPT

## 2020-01-29 PROCEDURE — 80053 COMPREHEN METABOLIC PANEL: CPT

## 2020-01-29 PROCEDURE — 93010 ELECTROCARDIOGRAM REPORT: CPT

## 2020-01-29 PROCEDURE — 85610 PROTHROMBIN TIME: CPT

## 2020-01-29 PROCEDURE — 85025 COMPLETE CBC W/AUTO DIFF WBC: CPT

## 2020-01-29 PROCEDURE — 83690 ASSAY OF LIPASE: CPT

## 2020-01-29 PROCEDURE — 81001 URINALYSIS AUTO W/SCOPE: CPT

## 2020-01-29 PROCEDURE — 36415 COLL VENOUS BLD VENIPUNCTURE: CPT

## 2020-01-29 PROCEDURE — 85730 THROMBOPLASTIN TIME PARTIAL: CPT

## 2020-01-29 PROCEDURE — 99285 EMERGENCY DEPT VISIT HI MDM: CPT

## 2020-01-29 PROCEDURE — 84484 ASSAY OF TROPONIN QUANT: CPT

## 2020-01-29 PROCEDURE — 74177 CT ABD & PELVIS W/CONTRAST: CPT

## 2020-01-29 NOTE — XRAY REPORT
CHEST 2 VIEWS 



INDICATION / CLINICAL INFORMATION:

Chest pain.



COMPARISON: 

2 views of the chest from 9/20/2017.



FINDINGS:



SUPPORT DEVICES: None.

HEART / MEDIASTINUM: No significant abnormality. 

LUNGS / PLEURA: There is mild left basilar atelectasis/scarring. The lungs are otherwise clear. No si
gnificant pleural effusion. No pneumothorax. 



ADDITIONAL FINDINGS: No significant additional findings.



IMPRESSION:

1. No acute abnormality of the chest. 

2. Mild left basilar atelectasis/scarring.



Signer Name: Codey Morfin MD 

Signed: 1/29/2020 1:29 PM

 Workstation Name: [id number]

## 2020-01-29 NOTE — EMERGENCY DEPARTMENT REPORT
ED General Adult HPI





- General


Chief complaint: Chest Pain


Stated complaint: CHEST PAIN


Time Seen by Provider: 01/29/20 11:21


Source: patient, EMS


Mode of arrival: Ambulatory


Limitations: No Limitations





- History of Present Illness


Initial comments: 





Patient is a 59-year-old female presents the emergency room with complaints of 

substernal chest pain that began a couple days ago.  She states it feels like a 

squeezing sensation. She states that she also has had abdominal distention and 

generalized abdominal pain.  She states about a year ago she had a scan of her 

abdomen which she states they "found something but she does not know what it 

was".  She denies any nausea, vomiting, diarrhea, shortness of breath, leg 

swelling, recent travel, recent surgery, hormone use.  Past medical history of 

hypertension, anemia, MI with one stent.  She states that she had a stress test 

about one to 2 years ago which she states was normal.  She has not followed up 

with a cardiologist.  She states that she drinks approximately a sixpack of beer

a weekend.  She states that she also smokes daily.





- Related Data


                                  Previous Rx's











 Medication  Instructions  Recorded  Last Taken  Type


 


Famotidine [Pepcid] 40 mg PO QHS #30 tablet 01/29/20 Unknown Rx


 


Metoprolol [Lopressor TAB] 50 mg PO BID #60 tablet 01/29/20 Unknown Rx


 


Sucralfate [Carafate] 1 gm PO ACHS #21 tablet 01/29/20 Unknown Rx


 


amLODIPine 5 mg PO QDAY #30 tablet 01/29/20 Unknown Rx











                                    Allergies











Allergy/AdvReac Type Severity Reaction Status Date / Time


 


No Known Allergies Allergy   Unverified 02/23/14 12:06














ED Review of Systems


ROS: 


Stated complaint: CHEST PAIN


Other details as noted in HPI





Comment: All other systems reviewed and negative





ED Past Medical Hx





- Past Medical History


Previous Medical History?: Yes


Hx Hypertension: Yes


Hx Heart Attack/AMI: Yes (2006)


Hx Congestive Heart Failure: No


Hx Diabetes: No


Hx GERD: Yes


Hx Asthma: No


Hx COPD: No


Hx Dementia: No


Additional medical history: CAD, high cholesterol





- Surgical History


Past Surgical History?: Yes


Hx Coronary Stent: Yes


Additional Surgical History: hysterectomy





- Social History


Smoking Status: Current Every Day Smoker


Substance Use Type: Alcohol





- Medications


Home Medications: 


                                Home Medications











 Medication  Instructions  Recorded  Confirmed  Last Taken  Type


 


Famotidine [Pepcid] 40 mg PO QHS #30 tablet 01/29/20  Unknown Rx


 


Metoprolol [Lopressor TAB] 50 mg PO BID #60 tablet 01/29/20  Unknown Rx


 


Sucralfate [Carafate] 1 gm PO ACHS #21 tablet 01/29/20  Unknown Rx


 


amLODIPine 5 mg PO QDAY #30 tablet 01/29/20  Unknown Rx














ED Physical Exam





- General


Limitations: No Limitations


General appearance: alert, in no apparent distress





- Head


Head exam: Present: atraumatic, normocephalic





- Eye


Eye exam: Present: normal appearance





- ENT


ENT exam: Present: mucous membranes moist





- Respiratory


Respiratory exam: Present: normal lung sounds bilaterally.  Absent: respiratory 

distress, wheezes, rales, rhonchi, stridor, chest wall tenderness, accessory 

muscle use, decreased breath sounds, prolonged expiratory





- Cardiovascular


Cardiovascular Exam: Present: regular rate, normal rhythm, normal heart sounds. 

 Absent: systolic murmur, diastolic murmur, rubs, gallop





- GI/Abdominal


GI/Abdominal exam: Present: soft, distended, tenderness (mild generalized), 

normal bowel sounds.  Absent: guarding, rebound, rigid





- Neurological Exam


Neurological exam: Present: alert, oriented X3





- Psychiatric


Psychiatric exam: Present: normal affect, normal mood





- Skin


Skin exam: Present: warm, dry, intact





ED Course


                                   Vital Signs











  01/29/20 01/29/20 01/29/20





  11:07 11:09 11:10


 


Temperature   


 


Pulse Rate 65 65 


 


Respiratory 16 20 18





Rate   


 


Blood Pressure  166/88 


 


O2 Sat by Pulse 99 99 





Oximetry   














  01/29/20 01/29/20 01/29/20





  11:15 11:22 11:30


 


Temperature  98.0 F 


 


Pulse Rate 65  61


 


Respiratory 17  16





Rate   


 


Blood Pressure 164/90  163/91


 


O2 Sat by Pulse 99  100





Oximetry   














  01/29/20 01/29/20 01/29/20





  11:46 12:16 12:30


 


Temperature   


 


Pulse Rate 73 62 70


 


Respiratory 11 L 14 16





Rate   


 


Blood Pressure 163/91 158/90 155/97


 


O2 Sat by Pulse 99 99 98





Oximetry   














  01/29/20 01/29/20 01/29/20





  12:46 13:00 15:18


 


Temperature   


 


Pulse Rate 65 64 


 


Respiratory 13 14 20





Rate   


 


Blood Pressure 155/97 154/84 168/84


 


O2 Sat by Pulse 100 97 98





Oximetry   














  01/29/20 01/29/20 01/29/20





  15:30 15:56 16:00


 


Temperature   


 


Pulse Rate 65  66


 


Respiratory 20  12





Rate   


 


Blood Pressure 152/84 152/84 157/69


 


O2 Sat by Pulse 99 98 100





Oximetry   














  01/29/20 01/29/20 01/29/20





  16:16 16:30 16:45


 


Temperature   


 


Pulse Rate 66 71 71


 


Respiratory 18 20 19





Rate   


 


Blood Pressure 154/84 133/57 133/57


 


O2 Sat by Pulse 98 98 99





Oximetry   














  01/29/20 01/29/20 01/29/20





  17:00 17:16 17:30


 


Temperature   


 


Pulse Rate 64  


 


Respiratory 18  





Rate   


 


Blood Pressure 142/73 133/57 157/62


 


O2 Sat by Pulse 97 99 97





Oximetry   














  01/29/20





  17:46


 


Temperature 


 


Pulse Rate 


 


Respiratory 





Rate 


 


Blood Pressure 142/73


 


O2 Sat by Pulse 99





Oximetry 














ED Medical Decision Making





- Lab Data


Result diagrams: 


                                 01/29/20 13:10





                                 01/29/20 13:10








                                   Lab Results











  01/29/20 01/29/20 01/29/20 Range/Units





  13:10 13:10 13:10 


 


WBC  7.1    (4.5-11.0)  K/mm3


 


RBC  4.25    (3.65-5.03)  M/mm3


 


Hgb  12.6    (10.1-14.3)  gm/dl


 


Hct  36.9    (30.3-42.9)  %


 


MCV  87    (79-97)  fl


 


MCH  30    (28-32)  pg


 


MCHC  34    (30-34)  %


 


RDW  13.0 L    (13.2-15.2)  %


 


Plt Count  236    (140-440)  K/mm3


 


Lymph % (Auto)  54.7 H    (13.4-35.0)  %


 


Mono % (Auto)  5.8    (0.0-7.3)  %


 


Eos % (Auto)  2.5    (0.0-4.3)  %


 


Baso % (Auto)  0.9    (0.0-1.8)  %


 


Lymph #  3.9    (1.2-5.4)  K/mm3


 


Mono #  0.4    (0.0-0.8)  K/mm3


 


Eos #  0.2    (0.0-0.4)  K/mm3


 


Baso #  0.1    (0.0-0.1)  K/mm3


 


Seg Neutrophils %  36.1 L    (40.0-70.0)  %


 


Seg Neutrophils #  2.6    (1.8-7.7)  K/mm3


 


PT   13.6   (12.2-14.9)  Sec.


 


INR   1.03   (0.87-1.13)  


 


APTT   32.5   (24.2-36.6)  Sec.


 


Sodium    142  (137-145)  mmol/L


 


Potassium    4.1  (3.6-5.0)  mmol/L


 


Chloride    103.8  ()  mmol/L


 


Carbon Dioxide    24  (22-30)  mmol/L


 


Anion Gap    18  mmol/L


 


BUN    12  (7-17)  mg/dL


 


Creatinine    0.6 L  (0.7-1.2)  mg/dL


 


Estimated GFR    > 60  ml/min


 


BUN/Creatinine Ratio    20  %


 


Glucose    114 H  ()  mg/dL


 


Calcium    9.3  (8.4-10.2)  mg/dL


 


Total Bilirubin    0.70  (0.1-1.2)  mg/dL


 


AST    53 H  (5-40)  units/L


 


ALT    76 H  (7-56)  units/L


 


Alkaline Phosphatase    66  ()  units/L


 


Troponin T    < 0.010  (0.00-0.029)  ng/mL


 


Total Protein    7.9  (6.3-8.2)  g/dL


 


Albumin    4.2  (3.9-5)  g/dL


 


Albumin/Globulin Ratio    1.1  %


 


Lipase     (13-60)  units/L


 


Urine Color     (Yellow)  


 


Urine Turbidity     (Clear)  


 


Urine pH     (5.0-7.0)  


 


Ur Specific Gravity     (1.003-1.030)  


 


Urine Protein     (Negative)  mg/dL


 


Urine Glucose (UA)     (Negative)  mg/dL


 


Urine Ketones     (Negative)  mg/dL


 


Urine Blood     (Negative)  


 


Urine Nitrite     (Negative)  


 


Urine Bilirubin     (Negative)  


 


Urine Urobilinogen     (<2.0)  mg/dL


 


Ur Leukocyte Esterase     (Negative)  


 


Urine WBC (Auto)     (0.0-6.0)  /HPF


 


Urine RBC (Auto)     (0.0-6.0)  /HPF


 


U Epithel Cells (Auto)     (0-13.0)  /HPF


 


Urine Mucus     /HPF














  01/29/20 01/29/20 01/29/20 Range/Units





  13:10 15:23 15:24 


 


WBC     (4.5-11.0)  K/mm3


 


RBC     (3.65-5.03)  M/mm3


 


Hgb     (10.1-14.3)  gm/dl


 


Hct     (30.3-42.9)  %


 


MCV     (79-97)  fl


 


MCH     (28-32)  pg


 


MCHC     (30-34)  %


 


RDW     (13.2-15.2)  %


 


Plt Count     (140-440)  K/mm3


 


Lymph % (Auto)     (13.4-35.0)  %


 


Mono % (Auto)     (0.0-7.3)  %


 


Eos % (Auto)     (0.0-4.3)  %


 


Baso % (Auto)     (0.0-1.8)  %


 


Lymph #     (1.2-5.4)  K/mm3


 


Mono #     (0.0-0.8)  K/mm3


 


Eos #     (0.0-0.4)  K/mm3


 


Baso #     (0.0-0.1)  K/mm3


 


Seg Neutrophils %     (40.0-70.0)  %


 


Seg Neutrophils #     (1.8-7.7)  K/mm3


 


PT     (12.2-14.9)  Sec.


 


INR     (0.87-1.13)  


 


APTT     (24.2-36.6)  Sec.


 


Sodium     (137-145)  mmol/L


 


Potassium     (3.6-5.0)  mmol/L


 


Chloride     ()  mmol/L


 


Carbon Dioxide     (22-30)  mmol/L


 


Anion Gap     mmol/L


 


BUN     (7-17)  mg/dL


 


Creatinine     (0.7-1.2)  mg/dL


 


Estimated GFR     ml/min


 


BUN/Creatinine Ratio     %


 


Glucose     ()  mg/dL


 


Calcium     (8.4-10.2)  mg/dL


 


Total Bilirubin     (0.1-1.2)  mg/dL


 


AST     (5-40)  units/L


 


ALT     (7-56)  units/L


 


Alkaline Phosphatase     ()  units/L


 


Troponin T    < 0.010  (0.00-0.029)  ng/mL


 


Total Protein     (6.3-8.2)  g/dL


 


Albumin     (3.9-5)  g/dL


 


Albumin/Globulin Ratio     %


 


Lipase  34    (13-60)  units/L


 


Urine Color   Yellow   (Yellow)  


 


Urine Turbidity   Clear   (Clear)  


 


Urine pH   7.0   (5.0-7.0)  


 


Ur Specific Gravity   1.016   (1.003-1.030)  


 


Urine Protein   <15 mg/dl   (Negative)  mg/dL


 


Urine Glucose (UA)   Neg   (Negative)  mg/dL


 


Urine Ketones   Neg   (Negative)  mg/dL


 


Urine Blood   Neg   (Negative)  


 


Urine Nitrite   Neg   (Negative)  


 


Urine Bilirubin   Neg   (Negative)  


 


Urine Urobilinogen   2.0   (<2.0)  mg/dL


 


Ur Leukocyte Esterase   Neg   (Negative)  


 


Urine WBC (Auto)   1.0   (0.0-6.0)  /HPF


 


Urine RBC (Auto)   3.0   (0.0-6.0)  /HPF


 


U Epithel Cells (Auto)   3.0   (0-13.0)  /HPF


 


Urine Mucus   Few   /HPF














- EKG Data


EKG shows normal: sinus rhythm, axis, intervals, QRS complexes, ST-T waves


Rate: normal





- Radiology Data


Radiology results: report reviewed





CHEST 2 VIEWS 





 INDICATION / CLINICAL INFORMATION: 


 Chest pain. 





 COMPARISON: 


 2 views of the chest from 9/20/2017. 





 FINDINGS: 





 SUPPORT DEVICES: None. 


 HEART / MEDIASTINUM: No significant abnormality. 


 LUNGS / PLEURA: There is mild left basilar atelectasis/scarring. The lungs are 

otherwise clear. No 


 significant pleural effusion. No pneumothorax. 





 ADDITIONAL FINDINGS: No significant additional findings. 





 IMPRESSION: 


 1. No acute abnormality of the chest. 


 2. Mild left basilar atelectasis/scarring. 





 Signer Name: Codey Morfin MD 


 Signed: 1/29/2020 1:29 PM 


 Workstation Name: [id number] 








 Transcribed By: MN 


 Dictated By: Codey Morfin MD 


 Electronically Authenticated By: Codey Morfin MD 


 Signed Date/Time: 01/29/20 1329 











 DD/DT: 01/29/20 1328 


 TD/TT: 





CT ABDOMEN AND PELVIS WITH CONTRAST 





 HISTORY: MAIN: generalized abd pain, abdominal distension ABD SWELLING THAT 

COMES AND GOES AND GET 


 HARD X 2 MONTHS . 





 COMPARISON: None. 





 TECHNIQUE: CT images of the abdomen and pelvis were obtained following 

administration of 


 intravenous contrast. All CT scans at this location are performed using CT dose

 reduction for ALARA


 by means of automated exposure control. 





 CONTRAST: 100 ml of intravenous contrast administered. 





 FINDINGS: 





 Lungs/bones: Lung bases are clear. There are mild degenerative changes in the 

spine and pelvis 


 with no acute osseous abnormality. 





 Abdomen/pelvis: There is hepatic steatosis with no focal mass identified. The 

gallbladder, spleen,


 pancreas, adrenals, right kidney, and proximal GI tract appear unremarkable 

except for questionable 


 mild wall thickening along the distal stomach near the antrum and pylorus 

extending to the duodenal 


 bulb. Proximal GI tract otherwise appears unremarkable. There is a small fat-

containing umbilical 


 hernia. Moderately advanced atherosclerotic disease is present in the abdominal

 aorta and branch 


 vessels. 





 Urinary bladder is unremarkable. Uterus is surgically absent. No pelvic free 

fluid. There is 


 colonic diverticulosis with no acute inflammatory change identified. The 

terminal ileum and appendix


 appear normal. A tiny follicle is again seen in the left ovary. 





 IMPRESSION: 


 1. Questionable mild wall thickening along the distal stomach extending to the 

duodenal bulb. 


 Correlate for potential enteritis or ulcerative disease. 


 2. Additional incidental findings as above. 





 Signer Name: Denny Castillo MD 


 Signed: 1/29/2020 4:24 PM 


 Workstation Name: [id number] 








 Transcribed By: ED 


 Dictated By: Denny Castillo MD 


 Electronically Authenticated By: Denny Castillo MD 


 Signed Date/Time: 01/29/20 1624 











 DD/DT: 01/29/20 1618 


 TD/TT: 





- Medical Decision Making


Patient is a 59-year-old female presents the emergency room with complaints of 

substernal chest pain that began a couple days ago.  She states it feels like a 

squeezing sensation. She states that she also has had abdominal distention and 

generalized abdominal pain.  She states about a year ago she had a scan of her 

abdomen which she states they "found something but she does not know what it 

was".  She denies any nausea, vomiting, diarrhea, shortness of breath, leg 

swelling, recent travel, recent surgery, hormone use.  Past medical history of 

hypertension, anemia, MI with one stent.  She states that she had a stress test 

about one to 2 years ago which she states was normal.  She has not followed up 

with a cardiologist.  She states that she drinks approximately a sixpack of beer

 a weekend.  She states that she also smokes daily. VSS. labs are stable, mild 

elevation in AST/ALT which could be due to her alcohol consumption, will have 

her follow up with GI for this, discussed alcohol cessation. troponin is 

negative x2. EKG WNL. heart score is 3,low risk for cardiac event, EKG WNL, trop

 negative x2, chest pain referral sheet faxed for urgent cardiology follow up. 

CXR:  1. No acute abnormality of the chest. 2. Mild left basilar 

atelectasis/scarring.  CT abd pelvis: 1. Questionable mild wall thickening along

 the distal stomach extending to the duodenal bulb. Correlate for potential 

enteritis or ulcerative disease. 2. Additional incidental findings as above. pt 

given a GI cocktail and her symptoms improve. pt states she is running out of 

her blood pressure medications and asked for refills. refilled her home meds. pt

 also given prescription for carafate and pepcid. advised pt to please take 

medication as prescribed.  Please follow the diet for gastritis.  Please follow-

up with a cardiologist.  Please follow-up with a GI doctor.  Return to the em

ergency room immediately for any new or worsening symptoms.





- Differential Diagnosis


PUD, GERD, ACS, mass, obstruction, pancreatitis, hepatitis, cholecystitis


Critical care attestation.: 


If time is entered above; I have spent that time in minutes in the direct care 

of this critically ill patient, excluding procedure time.








ED Disposition


Clinical Impression: 


 Abdominal distention, Enteritis





Chest pain


Qualifiers:


 Chest pain type: unspecified Qualified Code(s): R07.9 - Chest pain, unspecified





Abdominal pain


Qualifiers:


 Abdominal location: generalized Qualified Code(s): R10.84 - Generalized 

abdominal pain





Umbilical hernia


Qualifiers:


 Obstruction and gangrene presence: without obstruction or gangrene Qualified 

Code(s): K42.9 - Umbilical hernia without obstruction or gangrene





Disposition: DC-01 TO HOME OR SELFCARE


Is pt being admited?: No


Does the pt Need Aspirin: No


Condition: Stable


Instructions:  Chest Pain (ED), Gastritis (ED), Diet for Ulcers and Gastritis 

(ED), Abdominal Pain (ED)


Additional Instructions: 


please take medication as prescribed.  Please follow the diet for gastritis.  

Please follow-up with a cardiologist.  Please follow-up with a GI doctor.  

Return to the emergency room immediately for any new or worsening symptoms.


Prescriptions: 


Famotidine [Pepcid] 40 mg PO QHS #30 tablet


amLODIPine 5 mg PO QDAY #30 tablet


Sucralfate [Carafate] 1 gm PO ACHS #21 tablet


Metoprolol [Lopressor TAB] 50 mg PO BID #60 tablet


Referrals: 


KAILYN AGUILERA MD [Staff Physician] - 2-3 Days


[place] [facility] [Primary Care Provider] - 2-3 Days


[facility] [Provider Group] - 2-3 Days


Forms:  Work/School Release Form(ED)


Time of Disposition: 18:35


Print Language: ENGLISH